# Patient Record
Sex: MALE | Race: WHITE
[De-identification: names, ages, dates, MRNs, and addresses within clinical notes are randomized per-mention and may not be internally consistent; named-entity substitution may affect disease eponyms.]

---

## 2020-06-25 ENCOUNTER — HOSPITAL ENCOUNTER (OUTPATIENT)
Dept: HOSPITAL 92 - LABBT | Age: 80
Discharge: HOME | End: 2020-06-25
Attending: ORTHOPAEDIC SURGERY
Payer: MEDICARE

## 2020-06-25 DIAGNOSIS — Z11.59: ICD-10-CM

## 2020-06-25 DIAGNOSIS — M17.11: ICD-10-CM

## 2020-06-25 DIAGNOSIS — Z01.818: Primary | ICD-10-CM

## 2020-06-25 LAB
ANION GAP SERPL CALC-SCNC: 11 MMOL/L (ref 10–20)
BASOPHILS # BLD AUTO: 0 THOU/UL (ref 0–0.2)
BASOPHILS NFR BLD AUTO: 0.6 % (ref 0–1)
BUN SERPL-MCNC: 17 MG/DL (ref 8.4–25.7)
CALCIUM SERPL-MCNC: 9 MG/DL (ref 7.8–10.44)
CHLORIDE SERPL-SCNC: 103 MMOL/L (ref 98–107)
CO2 SERPL-SCNC: 31 MMOL/L (ref 23–31)
CREAT CL PREDICTED SERPL C-G-VRATE: 0 ML/MIN (ref 70–130)
EOSINOPHIL # BLD AUTO: 0.2 THOU/UL (ref 0–0.7)
EOSINOPHIL NFR BLD AUTO: 2.6 % (ref 0–10)
GLUCOSE SERPL-MCNC: 126 MG/DL (ref 83–110)
HGB BLD-MCNC: 14 G/DL (ref 14–18)
INR PPP: 0.9
LYMPHOCYTES # BLD: 2.1 THOU/UL (ref 1.2–3.4)
LYMPHOCYTES NFR BLD AUTO: 31.1 % (ref 21–51)
MCH RBC QN AUTO: 30.1 PG (ref 27–31)
MCV RBC AUTO: 96.1 FL (ref 78–98)
MONOCYTES # BLD AUTO: 0.4 THOU/UL (ref 0.11–0.59)
MONOCYTES NFR BLD AUTO: 6.2 % (ref 0–10)
NEUTROPHILS # BLD AUTO: 3.9 THOU/UL (ref 1.4–6.5)
NEUTROPHILS NFR BLD AUTO: 59.4 % (ref 42–75)
PLATELET # BLD AUTO: 186 THOU/UL (ref 130–400)
POTASSIUM SERPL-SCNC: 4.4 MMOL/L (ref 3.5–5.1)
PROTHROMBIN TIME: 12.5 SEC (ref 12–14.7)
RBC # BLD AUTO: 4.63 MILL/UL (ref 4.7–6.1)
RBC UR QL AUTO: (no result) HPF (ref 0–3)
SODIUM SERPL-SCNC: 141 MMOL/L (ref 136–145)
WBC # BLD AUTO: 6.6 THOU/UL (ref 4.8–10.8)
WBC UR QL AUTO: (no result) HPF (ref 0–3)

## 2020-06-25 PROCEDURE — 87635 SARS-COV-2 COVID-19 AMP PRB: CPT

## 2020-06-25 PROCEDURE — U0003 INFECTIOUS AGENT DETECTION BY NUCLEIC ACID (DNA OR RNA); SEVERE ACUTE RESPIRATORY SYNDROME CORONAVIRUS 2 (SARS-COV-2) (CORONAVIRUS DISEASE [COVID-19]), AMPLIFIED PROBE TECHNIQUE, MAKING USE OF HIGH THROUGHPUT TECHNOLOGIES AS DESCRIBED BY CMS-2020-01-R: HCPCS

## 2020-06-25 PROCEDURE — 87081 CULTURE SCREEN ONLY: CPT

## 2020-06-25 PROCEDURE — 80048 BASIC METABOLIC PNL TOTAL CA: CPT

## 2020-06-25 PROCEDURE — 85610 PROTHROMBIN TIME: CPT

## 2020-06-25 PROCEDURE — 85025 COMPLETE CBC W/AUTO DIFF WBC: CPT

## 2020-06-25 PROCEDURE — 93005 ELECTROCARDIOGRAM TRACING: CPT

## 2020-06-25 PROCEDURE — 93010 ELECTROCARDIOGRAM REPORT: CPT

## 2020-06-25 PROCEDURE — 81001 URINALYSIS AUTO W/SCOPE: CPT

## 2020-06-30 ENCOUNTER — HOSPITAL ENCOUNTER (OUTPATIENT)
Dept: HOSPITAL 92 - SDC | Age: 80
LOS: 2 days | Discharge: HOME | End: 2020-07-02
Attending: ORTHOPAEDIC SURGERY
Payer: MEDICARE

## 2020-06-30 VITALS — BODY MASS INDEX: 41 KG/M2

## 2020-06-30 DIAGNOSIS — E78.5: ICD-10-CM

## 2020-06-30 DIAGNOSIS — Z95.5: ICD-10-CM

## 2020-06-30 DIAGNOSIS — F32.9: ICD-10-CM

## 2020-06-30 DIAGNOSIS — N40.0: ICD-10-CM

## 2020-06-30 DIAGNOSIS — F17.200: ICD-10-CM

## 2020-06-30 DIAGNOSIS — M17.11: Primary | ICD-10-CM

## 2020-06-30 DIAGNOSIS — I10: ICD-10-CM

## 2020-06-30 DIAGNOSIS — Z79.899: ICD-10-CM

## 2020-06-30 DIAGNOSIS — G25.81: ICD-10-CM

## 2020-06-30 DIAGNOSIS — J43.9: ICD-10-CM

## 2020-06-30 DIAGNOSIS — G89.18: ICD-10-CM

## 2020-06-30 DIAGNOSIS — Z79.82: ICD-10-CM

## 2020-06-30 DIAGNOSIS — M21.161: ICD-10-CM

## 2020-06-30 PROCEDURE — C1713 ANCHOR/SCREW BN/BN,TIS/BN: HCPCS

## 2020-06-30 PROCEDURE — 27447 TOTAL KNEE ARTHROPLASTY: CPT

## 2020-06-30 PROCEDURE — 3E0T3BZ INTRODUCTION OF ANESTHETIC AGENT INTO PERIPHERAL NERVES AND PLEXI, PERCUTANEOUS APPROACH: ICD-10-PCS | Performed by: ORTHOPAEDIC SURGERY

## 2020-06-30 PROCEDURE — 64445 NJX AA&/STRD SCIATIC NRV IMG: CPT

## 2020-06-30 PROCEDURE — 73560 X-RAY EXAM OF KNEE 1 OR 2: CPT

## 2020-06-30 PROCEDURE — 85027 COMPLETE CBC AUTOMATED: CPT

## 2020-06-30 PROCEDURE — C1776 JOINT DEVICE (IMPLANTABLE): HCPCS

## 2020-06-30 PROCEDURE — 36415 COLL VENOUS BLD VENIPUNCTURE: CPT

## 2020-06-30 PROCEDURE — 97116 GAIT TRAINING THERAPY: CPT

## 2020-06-30 PROCEDURE — 97139 UNLISTED THERAPEUTIC PX: CPT

## 2020-06-30 PROCEDURE — 64448 NJX AA&/STRD FEM NRV NFS IMG: CPT

## 2020-06-30 PROCEDURE — 97530 THERAPEUTIC ACTIVITIES: CPT

## 2020-06-30 PROCEDURE — 20985 CPTR-ASST DIR MS PX: CPT

## 2020-06-30 PROCEDURE — 8E0YXBZ COMPUTER ASSISTED PROCEDURE OF LOWER EXTREMITY: ICD-10-PCS | Performed by: ORTHOPAEDIC SURGERY

## 2020-06-30 PROCEDURE — 0SRC0J9 REPLACEMENT OF RIGHT KNEE JOINT WITH SYNTHETIC SUBSTITUTE, CEMENTED, OPEN APPROACH: ICD-10-PCS | Performed by: ORTHOPAEDIC SURGERY

## 2020-06-30 PROCEDURE — 97110 THERAPEUTIC EXERCISES: CPT

## 2020-06-30 RX ADMIN — ASPIRIN SCH: 81 TABLET ORAL at 16:57

## 2020-06-30 RX ADMIN — CEFAZOLIN SODIUM SCH MLS: 2 SOLUTION INTRAVENOUS at 16:25

## 2020-06-30 RX ADMIN — CEFAZOLIN SODIUM SCH MLS: 2 SOLUTION INTRAVENOUS at 23:25

## 2020-06-30 RX ADMIN — Medication SCH: at 17:02

## 2020-06-30 RX ADMIN — ASPIRIN SCH MG: 81 TABLET ORAL at 20:39

## 2020-06-30 RX ADMIN — Medication SCH: at 17:01

## 2020-06-30 NOTE — OP
DATE OF PROCEDURE:  06/30/2020



This is Javier Carlos PA-C dictating a report for Arben Jacobsen MD.



PREOPERATIVE DIAGNOSIS:  End-stage tricompartmental osteoarthritis of right knee

with degenerative genu varum. 



POSTOPERATIVE DIAGNOSIS:  End-stage tricompartmental osteoarthritis of right knee

with degenerative genu varum. 



PROCEDURE PERFORMED:  Cemented cruciate-sparing computer-assisted navigated right

total knee arthroplasty. 



ASSISTANT:  Javier Carlos PA-C



ANESTHESIA:  General via endotracheal tube augmented with indwelling adductor canal

block and a single-shot sciatic block. 



COMPONENTS USED:  [employer] Triathlon cemented size 6 primary

cruciate-sparing femoral component with a size 6 cemented primary tibial baseplate,

an 11 mm polyethylene fixed bearing insert, and A35 patella button. 



TOURNIQUET TIME:  54 minutes at 300 mmHg.



FINDINGS:  End-stage severe degenerative tricompartmental disease, bone-on-bone

arthrosis, periarticular osteophyte formation, large serous effusion, hypertrophic

synovium, and suprapatellar scarring changes consistent with degenerative genu

varum. 



ESTIMATED BLOOD LOSS:  Less than 100.



INPUT:  1000 mL of crystalloid.



OUTPUT:  Not measured.  No Huffman placed.



DRAINS:  None.



SPECIMENS:  None.



COMPLICATIONS:  None.



COUNTS:  Correct.



INDICATION FOR SURGERY:  Dmitry is a 80-year-old white male who has had progressive

right knee pain and problem with standing and walking for the last 5 to 7 years.  He

has failed conservative management and elected to proceed with total knee

arthroplasty as definitive treatment of his pain. 



PROCEDURE IN DETAIL:  After informed consent was obtained in the preoperative

holding area, the patient was taken to the operative suite where general anesthesia

was induced.  Once adequate level of general anesthesia was obtained, the patient

was positioned and a well-padded tourniquet was placed around the right proximal

thigh.  The right lower extremity was then prepped and draped in the usual sterile

fashion.  Prior to exsanguination, a time-out was called and all members of the

surgical team agreed upon site, surgeon, and patient.  The extremity was then

exsanguinated and the tourniquet was raised.  A midline longitudinal incision was

then made directly over the patella extending 2 fingerbreadths above the superior

pole of the patella and 2 fingerbreadths inferior to the inferior patellar pole of

the patella.  Deeper subcutaneous layers were dissected sharply and local bleeding

was controlled with Bovie electrocautery.  A quad tendon longitudinal split was then

made sharply and a median parapatellar arthrotomy was carried out both sharp and

with Bovie electrocautery, carried down to 1 fingerbreadth medial to the tibial

tubercle.  The knee was then placed into flexion and the patella was everted nicely,

and a copious fat pad ectomy was performed, allowing for greater exposure of the

tibia.  The computer-assisted distal femoral fiducial was then placed and pinned

firmly, and the distal femoral cutting guide was pinned firmly into place.  The

oscillating saw was then used to remove the appropriate amount of bone.  The 4-in-1

cutting block was then placed on the distal femur and the oscillating saw was used

to remove the appropriate amount of bone off the anterior, posterior, and chamfer

cuts.  After completion of bone cuts, the anterior cruciate ligament was resected

sharply and the posterior cruciate ligament retractor was placed and the tibia was

subluxed for better exposure.  Partial meniscectomies were carried out, and the

tibial computer-assisted fiducial was pinned, and the cutting guide was placed.

Oscillating saw was then used to remove the bone, with Hohmann retractors used to

take care and protect the collateral ligaments.  After the tibial resection was

performed, a laminar  was placed in between the freshened bone cuts.  The

knee placed at 90 degrees and further bilateral meniscectomies were carried out, and

the curved osteotome and curettage were used to remove any excess bone spurs in the

posterior compartment.  The trial femoral component, tibial baseplate were placed

with the appropriate polyethylene trial insert with an appropriate polyethylene

spacer and patellar button.  The knee was taken through full range of motion with

flexion and extension from 0 to 90 degrees and patellar broach squarely in the

trochlea without any squinting or subluxation noted.  The knee was also stable to

varus and valgus stressing at 0, 15, 45, and 90 degrees of flexion.  The drawer was

negative.  All trial components were then removed and the keel punch was used to

provide the appropriate defect in the tibia with a mallet.  The freshened bone cuts

were copiously irrigated with pulsatile lavage of about 1.5 L to remove all excess

debris.  The freshened bone cuts were then dried with suction and lap sponge.  The

knee was placed in flexion and retractors were placed to provide access to all bone

cuts.  Tobramycin-impregnated methyl methacrylate cement was then placed on the

freshened bone cuts and implants which were malleted firmly into place.  Curettage

and [place] elevators were used to remove any excess bone cement.  The knee was placed

into full extension and the patellar button was placed under compression, and the

cement was allowed to cure.  Once completed, the components were again taken through

full range of motion and copious irrigation of the knee was carried out with another

liter of normal saline.  All components were inspected fully with full range of

motion and varus and valgus stressing. There was no laxity noted and full extension

was observed clinically.  Primary closure was accomplished with #2 interrupted

Vicryl stitch of the arthrotomy defect.  This was oversewn with a #2 running Quill

barbed stitch.  The subcutaneous layer was then closed with a running 0 barbed

Monocryl stitch and skin closure accomplished with a running subcuticular 3-0

Monocryl barbed Quill stitch and augmented with cement on the skin.  Tourniquet was

lowered.  Good spontaneous return of distal pulses was noted clinically and a

sterile dressing was applied to the incision.  The procedure was terminated without

any complications.  The patient was awakened in the operative suite and taken to the

recovery room in stable condition. 







Job ID:  [id number]

## 2020-06-30 NOTE — RAD
EXAM: 2 views of the right knee



HISTORY: Knee arthroplasty



COMPARISON: None



FINDINGS: No knee effusion is seen. The patient is status post knee arthroplasty without perihardware
 lucency or fracture. Air in the soft tissues is from recent surgery.



IMPRESSION: Status post knee arthroplasty without evidence of complication.



Reported By: John Little 

Electronically Signed:  6/30/2020 11:45 AM

## 2020-07-01 LAB
HGB BLD-MCNC: 11.6 G/DL (ref 14–18)
MCH RBC QN AUTO: 31.1 PG (ref 27–31)
MCV RBC AUTO: 96.3 FL (ref 78–98)
PLATELET # BLD AUTO: 160 THOU/UL (ref 130–400)
RBC # BLD AUTO: 3.73 MILL/UL (ref 4.7–6.1)
WBC # BLD AUTO: 10.6 THOU/UL (ref 4.8–10.8)

## 2020-07-01 RX ADMIN — ASPIRIN SCH MG: 81 TABLET ORAL at 20:47

## 2020-07-01 RX ADMIN — HYDROCODONE BITARTRATE AND ACETAMINOPHEN PRN TAB: 10; 325 TABLET ORAL at 23:40

## 2020-07-01 RX ADMIN — DOCUSATE SODIUM 50 MG AND SENNOSIDES 8.6 MG SCH TAB: 8.6; 5 TABLET, FILM COATED ORAL at 20:47

## 2020-07-01 RX ADMIN — MULTIPLE VITAMINS W/ MINERALS TAB SCH TAB: TAB at 08:21

## 2020-07-01 RX ADMIN — DOCUSATE SODIUM 50 MG AND SENNOSIDES 8.6 MG SCH TAB: 8.6; 5 TABLET, FILM COATED ORAL at 08:20

## 2020-07-01 RX ADMIN — Medication SCH MG: at 08:20

## 2020-07-01 RX ADMIN — Medication SCH UNITS: at 08:20

## 2020-07-01 RX ADMIN — ASPIRIN SCH MG: 81 TABLET ORAL at 08:21

## 2020-07-01 RX ADMIN — HYDROCODONE BITARTRATE AND ACETAMINOPHEN PRN TAB: 10; 325 TABLET ORAL at 16:12

## 2020-07-01 NOTE — PRG
DATE OF SERVICE:  07/01/2020



SUBJECTIVE:  Dmitry is a 80-year-old male, postoperative day 1 from a right total

knee arthroplasty.  He is comfortable and his pain is controlled.  He is doing very

well.  He has no complaints this morning. 



OBJECTIVE:  VITAL SIGNS:  Temperature 98, pulse 83, respiratory rate 16 and

unlabored, and blood pressure 116/68. 

GENERAL:  He is alert and oriented to person, place, time, and situation.

Responsive and appropriate with examiner. 

SKIN:  His incision is clean without any erythema and no strikethrough. 

MUSCULOSKELETAL:  He is neurovascularly intact in the right lower extremity.  He has

a little bit of residual numbness from the block, but no weakness is noted. 



LABORATORY DATA:  Hemoglobin and hematocrit of 11.6 and 35.9.



IMPRESSION:  This is a 80-year-old male, postoperative day 1 right total knee

arthroplasty, doing well. 



PLAN:  Continue current care.  Probable discharge to home tomorrow.







Job ID:  [id number]

## 2020-07-02 VITALS — SYSTOLIC BLOOD PRESSURE: 118 MMHG | DIASTOLIC BLOOD PRESSURE: 61 MMHG

## 2020-07-02 VITALS — TEMPERATURE: 97.6 F

## 2020-07-02 LAB
HGB BLD-MCNC: 10.9 G/DL (ref 14–18)
MCH RBC QN AUTO: 31 PG (ref 27–31)
MCV RBC AUTO: 95.4 FL (ref 78–98)
PLATELET # BLD AUTO: 153 THOU/UL (ref 130–400)
RBC # BLD AUTO: 3.52 MILL/UL (ref 4.7–6.1)
WBC # BLD AUTO: 9.9 THOU/UL (ref 4.8–10.8)

## 2020-07-02 RX ADMIN — HYDROCODONE BITARTRATE AND ACETAMINOPHEN PRN TAB: 10; 325 TABLET ORAL at 12:26

## 2020-07-02 RX ADMIN — MULTIPLE VITAMINS W/ MINERALS TAB SCH TAB: TAB at 10:10

## 2020-07-02 RX ADMIN — ASPIRIN SCH MG: 81 TABLET ORAL at 10:09

## 2020-07-02 RX ADMIN — DOCUSATE SODIUM 50 MG AND SENNOSIDES 8.6 MG SCH TAB: 8.6; 5 TABLET, FILM COATED ORAL at 10:11

## 2020-07-02 RX ADMIN — Medication SCH UNITS: at 10:10

## 2020-07-02 RX ADMIN — Medication SCH MG: at 10:11

## 2021-05-12 ENCOUNTER — HOSPITAL ENCOUNTER (OUTPATIENT)
Dept: HOSPITAL 92 - CSHMRI | Age: 81
Discharge: HOME | End: 2021-05-12
Attending: ORTHOPAEDIC SURGERY
Payer: MEDICARE

## 2021-05-12 DIAGNOSIS — M84.452A: ICD-10-CM

## 2021-05-12 DIAGNOSIS — M23.92: Primary | ICD-10-CM

## 2021-05-12 DIAGNOSIS — S83.232A: ICD-10-CM

## 2021-05-12 DIAGNOSIS — M25.462: ICD-10-CM

## 2021-09-30 ENCOUNTER — HOSPITAL ENCOUNTER (OUTPATIENT)
Dept: HOSPITAL 92 - LABBT | Age: 81
Discharge: HOME | End: 2021-09-30
Attending: ORTHOPAEDIC SURGERY
Payer: MEDICARE

## 2021-09-30 DIAGNOSIS — M17.12: ICD-10-CM

## 2021-09-30 DIAGNOSIS — Z20.822: ICD-10-CM

## 2021-09-30 DIAGNOSIS — Z01.812: Primary | ICD-10-CM

## 2021-09-30 LAB
ANION GAP SERPL CALC-SCNC: 14 MMOL/L (ref 10–20)
BASOPHILS # BLD AUTO: 0.1 10X3/UL (ref 0–0.2)
BASOPHILS NFR BLD AUTO: 0.6 % (ref 0–2)
BUN SERPL-MCNC: 16 MG/DL (ref 8.4–25.7)
CALCIUM SERPL-MCNC: 10 MG/DL (ref 7.8–10.44)
CHLORIDE SERPL-SCNC: 104 MMOL/L (ref 98–107)
CO2 SERPL-SCNC: 31 MMOL/L (ref 23–31)
CREAT CL PREDICTED SERPL C-G-VRATE: 0 ML/MIN (ref 70–130)
EOSINOPHIL # BLD AUTO: 0.4 10X3/UL (ref 0–0.5)
EOSINOPHIL NFR BLD AUTO: 4.5 % (ref 0–6)
GLUCOSE SERPL-MCNC: 96 MG/DL (ref 83–110)
HGB BLD-MCNC: 12.7 G/DL (ref 13.5–17.5)
INR PPP: 0.9
LYMPHOCYTES NFR BLD AUTO: 33.1 % (ref 18–47)
MCH RBC QN AUTO: 29.5 PG (ref 27–33)
MCV RBC AUTO: 96 FL (ref 81.2–95.1)
MONOCYTES # BLD AUTO: 0.9 10X3/UL (ref 0–1.1)
MONOCYTES NFR BLD AUTO: 10.5 % (ref 0–10)
NEUTROPHILS # BLD AUTO: 4.2 10X3/UL (ref 1.5–8.4)
NEUTROPHILS NFR BLD AUTO: 51.1 % (ref 40–75)
PLATELET # BLD AUTO: 189 10X3/UL (ref 150–450)
POTASSIUM SERPL-SCNC: 4.9 MMOL/L (ref 3.5–5.1)
PROTHROMBIN TIME: 10.4 SEC (ref 9.5–12.1)
RBC # BLD AUTO: 4.3 10X6/UL (ref 4.32–5.72)
SODIUM SERPL-SCNC: 144 MMOL/L (ref 136–145)
WBC # BLD AUTO: 8.2 10X3/UL (ref 3.5–10.5)

## 2021-09-30 PROCEDURE — U0005 INFEC AGEN DETEC AMPLI PROBE: HCPCS

## 2021-09-30 PROCEDURE — U0003 INFECTIOUS AGENT DETECTION BY NUCLEIC ACID (DNA OR RNA); SEVERE ACUTE RESPIRATORY SYNDROME CORONAVIRUS 2 (SARS-COV-2) (CORONAVIRUS DISEASE [COVID-19]), AMPLIFIED PROBE TECHNIQUE, MAKING USE OF HIGH THROUGHPUT TECHNOLOGIES AS DESCRIBED BY CMS-2020-01-R: HCPCS

## 2021-09-30 PROCEDURE — 87081 CULTURE SCREEN ONLY: CPT

## 2021-09-30 PROCEDURE — 85610 PROTHROMBIN TIME: CPT

## 2021-09-30 PROCEDURE — 80048 BASIC METABOLIC PNL TOTAL CA: CPT

## 2021-09-30 PROCEDURE — 85025 COMPLETE CBC W/AUTO DIFF WBC: CPT

## 2023-05-12 ENCOUNTER — HOSPITAL ENCOUNTER (OUTPATIENT)
Dept: HOSPITAL 92 - SDC | Age: 83
Discharge: HOME | End: 2023-05-12
Attending: INTERNAL MEDICINE
Payer: MEDICARE

## 2023-05-12 VITALS — BODY MASS INDEX: 41 KG/M2

## 2023-05-12 DIAGNOSIS — K21.9: ICD-10-CM

## 2023-05-12 DIAGNOSIS — E78.00: ICD-10-CM

## 2023-05-12 DIAGNOSIS — K64.8: ICD-10-CM

## 2023-05-12 DIAGNOSIS — G47.30: ICD-10-CM

## 2023-05-12 DIAGNOSIS — R13.10: ICD-10-CM

## 2023-05-12 DIAGNOSIS — K62.89: ICD-10-CM

## 2023-05-12 DIAGNOSIS — Z87.891: ICD-10-CM

## 2023-05-12 DIAGNOSIS — Z95.5: ICD-10-CM

## 2023-05-12 DIAGNOSIS — I10: ICD-10-CM

## 2023-05-12 DIAGNOSIS — Z79.82: ICD-10-CM

## 2023-05-12 DIAGNOSIS — D12.3: Primary | ICD-10-CM

## 2023-05-12 DIAGNOSIS — J44.9: ICD-10-CM

## 2023-05-12 DIAGNOSIS — M19.90: ICD-10-CM

## 2023-05-12 DIAGNOSIS — Z79.899: ICD-10-CM

## 2023-05-12 PROCEDURE — 0D758ZZ DILATION OF ESOPHAGUS, VIA NATURAL OR ARTIFICIAL OPENING ENDOSCOPIC: ICD-10-PCS | Performed by: INTERNAL MEDICINE

## 2023-05-12 PROCEDURE — 0DBL8ZX EXCISION OF TRANSVERSE COLON, VIA NATURAL OR ARTIFICIAL OPENING ENDOSCOPIC, DIAGNOSTIC: ICD-10-PCS | Performed by: INTERNAL MEDICINE

## 2023-05-12 PROCEDURE — 88305 TISSUE EXAM BY PATHOLOGIST: CPT

## 2025-01-03 ENCOUNTER — HOSPITAL ENCOUNTER (OUTPATIENT)
Dept: HOSPITAL 92 - BICRAD | Age: 85
Discharge: HOME | End: 2025-01-03
Attending: FAMILY MEDICINE
Payer: COMMERCIAL

## 2025-01-03 DIAGNOSIS — M79.89: Primary | ICD-10-CM

## 2025-01-03 PROCEDURE — 80053 COMPREHEN METABOLIC PANEL: CPT

## 2025-01-03 PROCEDURE — 36415 COLL VENOUS BLD VENIPUNCTURE: CPT

## 2025-01-03 PROCEDURE — 86140 C-REACTIVE PROTEIN: CPT

## 2025-01-03 PROCEDURE — 84550 ASSAY OF BLOOD/URIC ACID: CPT

## 2025-01-03 PROCEDURE — 85025 COMPLETE CBC W/AUTO DIFF WBC: CPT
